# Patient Record
Sex: MALE | Race: WHITE | NOT HISPANIC OR LATINO | Employment: FULL TIME | URBAN - METROPOLITAN AREA
[De-identification: names, ages, dates, MRNs, and addresses within clinical notes are randomized per-mention and may not be internally consistent; named-entity substitution may affect disease eponyms.]

---

## 2023-02-13 PROBLEM — M25.511 PAIN IN RIGHT SHOULDER: Status: ACTIVE | Noted: 2023-02-13

## 2023-02-13 PROBLEM — S46.919A SHOULDER STRAIN: Status: ACTIVE | Noted: 2023-02-13

## 2023-02-13 PROBLEM — J30.9 ALLERGIC RHINITIS: Status: ACTIVE | Noted: 2023-02-13

## 2023-02-13 PROBLEM — L20.9 ATOPIC DERMATITIS: Status: ACTIVE | Noted: 2023-02-13

## 2023-02-13 PROBLEM — R07.81 RIB PAIN ON LEFT SIDE: Status: ACTIVE | Noted: 2023-02-13

## 2023-02-13 PROBLEM — J45.909 ASTHMA (HHS-HCC): Status: ACTIVE | Noted: 2023-02-13

## 2023-02-13 PROBLEM — J02.0 STREP THROAT: Status: ACTIVE | Noted: 2023-02-13

## 2023-02-13 PROBLEM — D22.9: Status: ACTIVE | Noted: 2023-02-13

## 2023-02-13 PROBLEM — R05.3 COUGH, PERSISTENT: Status: ACTIVE | Noted: 2023-02-13

## 2023-02-13 PROBLEM — F41.8 SITUATIONAL ANXIETY: Status: ACTIVE | Noted: 2023-02-13

## 2023-02-13 PROBLEM — J20.9 ACUTE BRONCHITIS WITH BRONCHOSPASM: Status: ACTIVE | Noted: 2023-02-13

## 2023-02-13 PROBLEM — H69.91 DYSFUNCTION OF RIGHT EUSTACHIAN TUBE: Status: ACTIVE | Noted: 2023-02-13

## 2023-02-13 PROBLEM — L85.3 DRY SKIN: Status: ACTIVE | Noted: 2023-02-13

## 2023-02-13 PROBLEM — N64.4 MASTALGIA: Status: ACTIVE | Noted: 2023-02-13

## 2023-02-13 PROBLEM — M75.82 TENDINITIS OF LEFT PECTORALIS MAJOR: Status: ACTIVE | Noted: 2023-02-13

## 2023-02-13 PROBLEM — H60.543 ECZEMATOID OTITIS EXTERNA OF BOTH EARS: Status: ACTIVE | Noted: 2023-02-13

## 2023-02-13 PROBLEM — M25.559 HIP PAIN: Status: ACTIVE | Noted: 2023-02-13

## 2023-02-13 PROBLEM — M75.21 BICEPS TENDINITIS, RIGHT: Status: ACTIVE | Noted: 2023-02-13

## 2023-02-13 PROBLEM — F41.9 ANXIETY DISORDER: Status: ACTIVE | Noted: 2023-02-13

## 2023-02-13 PROBLEM — M24.159 LABRAL TEAR OF HIP, DEGENERATIVE: Status: ACTIVE | Noted: 2023-02-13

## 2023-02-13 PROBLEM — R07.89 COSTOCHONDRAL PAIN: Status: ACTIVE | Noted: 2023-02-13

## 2023-02-13 PROBLEM — J02.9 PHARYNGITIS: Status: ACTIVE | Noted: 2023-02-13

## 2023-02-13 PROBLEM — S46.811A STRAIN OF RIGHT DELTOID MUSCLE: Status: ACTIVE | Noted: 2023-02-13

## 2023-02-13 PROBLEM — B37.0 THRUSH, ORAL: Status: ACTIVE | Noted: 2023-02-13

## 2023-02-13 PROBLEM — R06.2 EXPIRATORY WHEEZING: Status: ACTIVE | Noted: 2023-02-13

## 2023-02-13 PROBLEM — N64.4 BREAST PAIN, LEFT: Status: ACTIVE | Noted: 2023-02-13

## 2023-02-13 PROBLEM — L64.9 MALE PATTERN BALDNESS: Status: ACTIVE | Noted: 2023-02-13

## 2023-02-13 PROBLEM — K64.4 EXTERNAL HEMORRHOIDS: Status: ACTIVE | Noted: 2023-02-13

## 2023-02-13 PROBLEM — J31.0 RHINITIS: Status: ACTIVE | Noted: 2023-02-13

## 2023-02-13 PROBLEM — N62 GYNECOMASTIA, MALE: Status: ACTIVE | Noted: 2023-02-13

## 2023-02-13 RX ORDER — CETIRIZINE HYDROCHLORIDE 10 MG/1
TABLET ORAL
COMMUNITY

## 2023-02-13 RX ORDER — NYSTATIN 100000 [USP'U]/ML
SUSPENSION ORAL
COMMUNITY
Start: 2019-02-12 | End: 2023-07-13 | Stop reason: ALTCHOICE

## 2023-02-13 RX ORDER — ALBUTEROL SULFATE 90 UG/1
2 AEROSOL, METERED RESPIRATORY (INHALATION) EVERY 4 HOURS PRN
COMMUNITY
Start: 2017-12-13 | End: 2023-07-13 | Stop reason: ALTCHOICE

## 2023-02-13 RX ORDER — FLUTICASONE PROPIONATE AND SALMETEROL 250; 50 UG/1; UG/1
POWDER RESPIRATORY (INHALATION)
COMMUNITY
End: 2023-07-13 | Stop reason: ALTCHOICE

## 2023-02-13 RX ORDER — CLOBETASOL PROPIONATE 0.5 MG/G
CREAM TOPICAL
COMMUNITY
Start: 2018-10-23 | End: 2023-07-13 | Stop reason: ALTCHOICE

## 2023-02-13 RX ORDER — ALBUTEROL SULFATE 0.83 MG/ML
SOLUTION RESPIRATORY (INHALATION)
COMMUNITY
Start: 2020-03-17 | End: 2023-07-13 | Stop reason: ALTCHOICE

## 2023-02-13 RX ORDER — MELOXICAM 15 MG/1
1 TABLET ORAL DAILY
COMMUNITY
Start: 2022-09-12 | End: 2023-07-13 | Stop reason: ALTCHOICE

## 2023-02-13 RX ORDER — TAMOXIFEN CITRATE 20 MG/1
1 TABLET ORAL DAILY
COMMUNITY
Start: 2021-11-19 | End: 2023-07-13 | Stop reason: ALTCHOICE

## 2023-02-13 RX ORDER — NEBULIZER AND COMPRESSOR
EACH MISCELLANEOUS
COMMUNITY
Start: 2020-03-16 | End: 2023-07-13 | Stop reason: ALTCHOICE

## 2023-02-13 RX ORDER — PROPRANOLOL HYDROCHLORIDE 10 MG/1
TABLET ORAL
COMMUNITY
Start: 2016-03-09 | End: 2023-07-13 | Stop reason: ALTCHOICE

## 2023-02-13 RX ORDER — AZELASTINE 1 MG/ML
2 SPRAY, METERED NASAL DAILY
COMMUNITY
Start: 2021-05-14 | End: 2023-07-13 | Stop reason: ALTCHOICE

## 2023-02-13 RX ORDER — FINASTERIDE 1 MG/1
TABLET, FILM COATED ORAL
COMMUNITY
Start: 2018-11-05 | End: 2024-03-01

## 2023-02-13 RX ORDER — FLUTICASONE PROPIONATE 50 MCG
SPRAY, SUSPENSION (ML) NASAL
COMMUNITY
End: 2024-05-29 | Stop reason: ALTCHOICE

## 2023-07-13 ENCOUNTER — OFFICE VISIT (OUTPATIENT)
Dept: PRIMARY CARE | Facility: CLINIC | Age: 43
End: 2023-07-13
Payer: COMMERCIAL

## 2023-07-13 VITALS — DIASTOLIC BLOOD PRESSURE: 84 MMHG | RESPIRATION RATE: 20 BRPM | SYSTOLIC BLOOD PRESSURE: 122 MMHG | HEART RATE: 74 BPM

## 2023-07-13 DIAGNOSIS — S03.00XA DISLOCATION OF TEMPOROMANDIBULAR JOINT, INITIAL ENCOUNTER: Primary | ICD-10-CM

## 2023-07-13 DIAGNOSIS — Z87.898 HISTORY OF GYNECOMASTIA: ICD-10-CM

## 2023-07-13 DIAGNOSIS — F45.8 BRUXISM (TEETH GRINDING): ICD-10-CM

## 2023-07-13 DIAGNOSIS — Z00.00 ROUTINE GENERAL MEDICAL EXAMINATION AT HEALTH CARE FACILITY: ICD-10-CM

## 2023-07-13 DIAGNOSIS — H93.11 TINNITUS OF RIGHT EAR: ICD-10-CM

## 2023-07-13 PROCEDURE — 99213 OFFICE O/P EST LOW 20 MIN: CPT | Performed by: INTERNAL MEDICINE

## 2023-07-13 RX ORDER — MONTELUKAST SODIUM 10 MG/1
10 TABLET ORAL NIGHTLY
COMMUNITY
End: 2023-08-02 | Stop reason: SDUPTHER

## 2023-07-13 ASSESSMENT — ENCOUNTER SYMPTOMS
SINUS PRESSURE: 0
FACIAL SWELLING: 0
VOICE CHANGE: 0
RHINORRHEA: 1
TROUBLE SWALLOWING: 0
SORE THROAT: 0
SINUS PAIN: 0

## 2023-07-13 NOTE — ASSESSMENT & PLAN NOTE
Will get him to ENT due to chronic symptoms but more likely from bruxism/tmj issues  Can try lipoflavonoid to see if helpful  Would lessen caffeine/salt consumption to see if this helps

## 2023-07-13 NOTE — PROGRESS NOTES
Subjective   Patient ID: Madhav Rodriguez is a 42 y.o. male who presents for Tinnitus.    HPI  Pt here in acute visit.  He has a high pitched ringing in his ear.  He thinks it is from the right ear only.  He has some pressure sensation in the right ear as well.  He has tried to pop his ear by holding his nose and blowing out.  He has constant drainage as well.  He is doing Flonase every day and zrytec/singulair.      He feels caffeine makes the ringing high.  He knows he grinds his teeth.  He does wear a  but this was OTC.  He has pain in jaw due to grinding.  His jaw hurts and clicks when mouth is open wide.      Review of Systems   HENT:  Positive for dental problem, postnasal drip, rhinorrhea and tinnitus. Negative for congestion, drooling, ear discharge, ear pain, facial swelling, hearing loss, mouth sores, nosebleeds, sinus pressure, sinus pain, sneezing, sore throat, trouble swallowing and voice change.        Objective   /84   Pulse 74   Resp 20    Physical Exam  HENT:      Right Ear: Tympanic membrane, ear canal and external ear normal.      Left Ear: Tympanic membrane, ear canal and external ear normal.      Nose: Congestion present. No rhinorrhea.      Mouth/Throat:      Mouth: Mucous membranes are dry.      Pharynx: Oropharynx is clear. No oropharyngeal exudate or posterior oropharyngeal erythema.   Neck:      Vascular: No carotid bruit.   Musculoskeletal:      Cervical back: No rigidity or tenderness.   Lymphadenopathy:      Cervical: No cervical adenopathy.   Neurological:      Mental Status: He is oriented to person, place, and time.         Assessment/Plan   Problem List Items Addressed This Visit       Dislocation of jaw - Primary     Pt has clicking/discomfort of the jaw on right that is likely cause of the tinnitus he is having  Would like him to see PT dedicated to this area  Would also like him to talk to dentist regarding grinding teeth and making of dental appliance to prevent  this           Relevant Orders    Referral to Physical Therapy    Tinnitus of right ear     Will get him to ENT due to chronic symptoms but more likely from bruxism/tmj issues  Can try lipoflavonoid to see if helpful  Would lessen caffeine/salt consumption to see if this helps          Relevant Orders    Referral to ENT     Other Visit Diagnoses       Bruxism (teeth grinding)        Relevant Orders    Referral to Physical Therapy    Routine general medical examination at health care facility        Relevant Orders    CBC    Comprehensive Metabolic Panel    Lipid Panel    Thyroid Stimulating Hormone    Urinalysis with Reflex Microscopic    Follow Up In Primary Care - Health Maintenance    History of gynecomastia        Relevant Orders    Prolactin level    Estrogens, Total    Testosterone, total and free

## 2023-07-13 NOTE — ASSESSMENT & PLAN NOTE
Pt has clicking/discomfort of the jaw on right that is likely cause of the tinnitus he is having  Would like him to see PT dedicated to this area  Would also like him to talk to dentist regarding grinding teeth and making of dental appliance to prevent this

## 2023-07-13 NOTE — PATIENT INSTRUCTIONS
Call and get into PT to work on the jaw and TMJ/grinding teeth issue   Call and get into ENT for chronic sinus issues  Lessen caffeine and salt in diet as these can make ringing ear worse  Lipoflavonoid is a vitamin that has been shown to help some with ringing in ear   Get your labs and urine done in next few weeks  Schedule physical in next 1-2 months

## 2023-07-31 ENCOUNTER — LAB (OUTPATIENT)
Dept: LAB | Facility: LAB | Age: 43
End: 2023-07-31
Payer: COMMERCIAL

## 2023-07-31 DIAGNOSIS — Z00.00 ROUTINE GENERAL MEDICAL EXAMINATION AT HEALTH CARE FACILITY: ICD-10-CM

## 2023-07-31 DIAGNOSIS — Z87.898 HISTORY OF GYNECOMASTIA: ICD-10-CM

## 2023-07-31 LAB
ALANINE AMINOTRANSFERASE (SGPT) (U/L) IN SER/PLAS: 21 U/L (ref 10–52)
ALBUMIN (G/DL) IN SER/PLAS: 5 G/DL (ref 3.4–5)
ALKALINE PHOSPHATASE (U/L) IN SER/PLAS: 68 U/L (ref 33–120)
ANION GAP IN SER/PLAS: 11 MMOL/L (ref 10–20)
APPEARANCE, URINE: CLEAR
ASPARTATE AMINOTRANSFERASE (SGOT) (U/L) IN SER/PLAS: 23 U/L (ref 9–39)
BILIRUBIN TOTAL (MG/DL) IN SER/PLAS: 0.8 MG/DL (ref 0–1.2)
BILIRUBIN, URINE: NEGATIVE
BLOOD, URINE: NEGATIVE
CALCIUM (MG/DL) IN SER/PLAS: 9.5 MG/DL (ref 8.6–10.6)
CARBON DIOXIDE, TOTAL (MMOL/L) IN SER/PLAS: 28 MMOL/L (ref 21–32)
CHLORIDE (MMOL/L) IN SER/PLAS: 104 MMOL/L (ref 98–107)
CHOLESTEROL (MG/DL) IN SER/PLAS: 192 MG/DL (ref 0–199)
CHOLESTEROL IN HDL (MG/DL) IN SER/PLAS: 56.4 MG/DL
CHOLESTEROL/HDL RATIO: 3.4
COLOR, URINE: YELLOW
CREATININE (MG/DL) IN SER/PLAS: 1.1 MG/DL (ref 0.5–1.3)
ERYTHROCYTE DISTRIBUTION WIDTH (RATIO) BY AUTOMATED COUNT: 11.6 % (ref 11.5–14.5)
ERYTHROCYTE MEAN CORPUSCULAR HEMOGLOBIN CONCENTRATION (G/DL) BY AUTOMATED: 34.6 G/DL (ref 32–36)
ERYTHROCYTE MEAN CORPUSCULAR VOLUME (FL) BY AUTOMATED COUNT: 95 FL (ref 80–100)
ERYTHROCYTES (10*6/UL) IN BLOOD BY AUTOMATED COUNT: 5.08 X10E12/L (ref 4.5–5.9)
GFR MALE: 84 ML/MIN/1.73M2
GLUCOSE (MG/DL) IN SER/PLAS: 98 MG/DL (ref 74–99)
GLUCOSE, URINE: NEGATIVE MG/DL
HEMATOCRIT (%) IN BLOOD BY AUTOMATED COUNT: 48.2 % (ref 41–52)
HEMOGLOBIN (G/DL) IN BLOOD: 16.7 G/DL (ref 13.5–17.5)
KETONES, URINE: NEGATIVE MG/DL
LDL: 120 MG/DL (ref 0–99)
LEUKOCYTE ESTERASE, URINE: NEGATIVE
LEUKOCYTES (10*3/UL) IN BLOOD BY AUTOMATED COUNT: 3.3 X10E9/L (ref 4.4–11.3)
NITRITE, URINE: NEGATIVE
NRBC (PER 100 WBCS) BY AUTOMATED COUNT: 0 /100 WBC (ref 0–0)
PH, URINE: 5 (ref 5–8)
PLATELETS (10*3/UL) IN BLOOD AUTOMATED COUNT: 154 X10E9/L (ref 150–450)
POTASSIUM (MMOL/L) IN SER/PLAS: 4.3 MMOL/L (ref 3.5–5.3)
PROLACTIN (UG/L) IN SER/PLAS: 5.8 UG/L (ref 2–18)
PROTEIN TOTAL: 7.2 G/DL (ref 6.4–8.2)
PROTEIN, URINE: NEGATIVE MG/DL
SODIUM (MMOL/L) IN SER/PLAS: 139 MMOL/L (ref 136–145)
SPECIFIC GRAVITY, URINE: 1.03 (ref 1–1.03)
THYROTROPIN (MIU/L) IN SER/PLAS BY DETECTION LIMIT <= 0.05 MIU/L: 1.56 MIU/L (ref 0.44–3.98)
TRIGLYCERIDE (MG/DL) IN SER/PLAS: 78 MG/DL (ref 0–149)
UREA NITROGEN (MG/DL) IN SER/PLAS: 31 MG/DL (ref 6–23)
UROBILINOGEN, URINE: <2 MG/DL (ref 0–1.9)
VLDL: 16 MG/DL (ref 0–40)

## 2023-07-31 PROCEDURE — 82672 ASSAY OF ESTROGEN: CPT

## 2023-07-31 PROCEDURE — 84146 ASSAY OF PROLACTIN: CPT

## 2023-07-31 PROCEDURE — 84402 ASSAY OF FREE TESTOSTERONE: CPT

## 2023-07-31 PROCEDURE — 80053 COMPREHEN METABOLIC PANEL: CPT

## 2023-07-31 PROCEDURE — 85027 COMPLETE CBC AUTOMATED: CPT

## 2023-07-31 PROCEDURE — 36415 COLL VENOUS BLD VENIPUNCTURE: CPT

## 2023-07-31 PROCEDURE — 84443 ASSAY THYROID STIM HORMONE: CPT

## 2023-07-31 PROCEDURE — 80061 LIPID PANEL: CPT

## 2023-07-31 PROCEDURE — 81003 URINALYSIS AUTO W/O SCOPE: CPT

## 2023-07-31 PROCEDURE — 84403 ASSAY OF TOTAL TESTOSTERONE: CPT

## 2023-08-01 DIAGNOSIS — J30.9 ALLERGIC RHINITIS, UNSPECIFIED: ICD-10-CM

## 2023-08-02 RX ORDER — MONTELUKAST SODIUM 10 MG/1
10 TABLET ORAL NIGHTLY
Qty: 30 TABLET | Refills: 5 | Status: SHIPPED | OUTPATIENT
Start: 2023-08-02 | End: 2023-11-14 | Stop reason: SDUPTHER

## 2023-08-05 LAB
TESTOSTERONE FREE (CHAN): 86.3 PG/ML (ref 35–155)
TESTOSTERONE,TOTAL,LC-MS/MS: 612 NG/DL (ref 250–1100)

## 2023-08-06 LAB — ESTROGEN,TOTAL: 56 PG/ML (ref 56–213)

## 2023-09-11 PROBLEM — R03.0 BLOOD PRESSURE ELEVATED WITHOUT HISTORY OF HTN: Status: ACTIVE | Noted: 2023-09-11

## 2023-09-11 PROBLEM — R10.13 DYSPEPSIA: Status: ACTIVE | Noted: 2017-12-19

## 2023-09-11 PROBLEM — R53.83 FATIGUE: Status: ACTIVE | Noted: 2023-09-11

## 2023-09-11 PROBLEM — K21.9 GASTROESOPHAGEAL REFLUX DISEASE: Status: ACTIVE | Noted: 2023-09-11

## 2023-09-11 RX ORDER — AZELASTINE HYDROCHLORIDE, FLUTICASONE PROPIONATE 137; 50 UG/1; UG/1
1 SPRAY, METERED NASAL 2 TIMES DAILY
COMMUNITY
Start: 2014-05-28 | End: 2024-05-29 | Stop reason: WASHOUT

## 2023-09-11 RX ORDER — ALBUTEROL SULFATE 90 UG/1
AEROSOL, METERED RESPIRATORY (INHALATION)
COMMUNITY
Start: 2019-02-12 | End: 2024-05-29 | Stop reason: WASHOUT

## 2023-09-11 RX ORDER — OMEPRAZOLE 10 MG/1
CAPSULE, DELAYED RELEASE ORAL
COMMUNITY
Start: 2017-10-12 | End: 2023-11-09

## 2023-09-11 RX ORDER — ACETAMINOPHEN 325 MG/1
650 TABLET ORAL EVERY 4 HOURS PRN
COMMUNITY
Start: 2014-12-31 | End: 2023-11-09 | Stop reason: WASHOUT

## 2023-09-11 RX ORDER — TAMOXIFEN CITRATE 20 MG/1
20 TABLET ORAL
COMMUNITY
Start: 2019-03-04 | End: 2023-11-14 | Stop reason: SDUPTHER

## 2023-09-11 RX ORDER — ETODOLAC 400 MG/1
1 TABLET, FILM COATED ORAL
COMMUNITY
Start: 2015-11-25 | End: 2023-11-09 | Stop reason: WASHOUT

## 2023-09-22 ENCOUNTER — APPOINTMENT (OUTPATIENT)
Dept: PRIMARY CARE | Facility: CLINIC | Age: 43
End: 2023-09-22
Payer: COMMERCIAL

## 2023-11-03 ENCOUNTER — TELEPHONE (OUTPATIENT)
Dept: PRIMARY CARE | Facility: CLINIC | Age: 43
End: 2023-11-03
Payer: COMMERCIAL

## 2023-11-03 DIAGNOSIS — U07.1 COVID: Primary | ICD-10-CM

## 2023-11-03 NOTE — TELEPHONE ENCOUNTER
I sent in paxlovid; as for physical next week depends on how he is feeling if past Day 5 (so needs to quarantine) for first 5 days of symptoms, day 6-10 can go out if feeling better with mask on  If still feeling bad can cancel

## 2023-11-03 NOTE — TELEPHONE ENCOUNTER
Madhav tested pos for COVID today his symptoms are a lot of drainage pressure sinus/chest fatigue. Asking for Paxlovid he has asthma. Also has H&P next week Thursday, should he keep that??

## 2023-11-09 ENCOUNTER — OFFICE VISIT (OUTPATIENT)
Dept: PRIMARY CARE | Facility: CLINIC | Age: 43
End: 2023-11-09
Payer: COMMERCIAL

## 2023-11-09 VITALS
BODY MASS INDEX: 26 KG/M2 | SYSTOLIC BLOOD PRESSURE: 126 MMHG | HEIGHT: 71 IN | WEIGHT: 185.7 LBS | HEART RATE: 72 BPM | RESPIRATION RATE: 16 BRPM | DIASTOLIC BLOOD PRESSURE: 80 MMHG

## 2023-11-09 DIAGNOSIS — N62 GYNECOMASTIA, MALE: ICD-10-CM

## 2023-11-09 DIAGNOSIS — J30.2 SEASONAL ALLERGIC RHINITIS, UNSPECIFIED TRIGGER: Primary | ICD-10-CM

## 2023-11-09 DIAGNOSIS — M24.159 LABRAL TEAR OF HIP, DEGENERATIVE: ICD-10-CM

## 2023-11-09 DIAGNOSIS — L64.9 MALE PATTERN BALDNESS: ICD-10-CM

## 2023-11-09 DIAGNOSIS — J45.20 MILD INTERMITTENT ASTHMA WITHOUT COMPLICATION (HHS-HCC): ICD-10-CM

## 2023-11-09 PROBLEM — J31.0 RHINITIS: Status: RESOLVED | Noted: 2023-02-13 | Resolved: 2023-11-09

## 2023-11-09 PROBLEM — J02.9 PHARYNGITIS: Status: RESOLVED | Noted: 2023-02-13 | Resolved: 2023-11-09

## 2023-11-09 PROBLEM — R03.0 BLOOD PRESSURE ELEVATED WITHOUT HISTORY OF HTN: Status: RESOLVED | Noted: 2023-09-11 | Resolved: 2023-11-09

## 2023-11-09 PROBLEM — B37.0 THRUSH, ORAL: Status: RESOLVED | Noted: 2023-02-13 | Resolved: 2023-11-09

## 2023-11-09 PROBLEM — R05.3 COUGH, PERSISTENT: Status: RESOLVED | Noted: 2023-02-13 | Resolved: 2023-11-09

## 2023-11-09 PROBLEM — R07.81 RIB PAIN ON LEFT SIDE: Status: RESOLVED | Noted: 2023-02-13 | Resolved: 2023-11-09

## 2023-11-09 PROBLEM — J02.0 STREP THROAT: Status: RESOLVED | Noted: 2023-02-13 | Resolved: 2023-11-09

## 2023-11-09 PROBLEM — J20.9 ACUTE BRONCHITIS WITH BRONCHOSPASM: Status: RESOLVED | Noted: 2023-02-13 | Resolved: 2023-11-09

## 2023-11-09 PROCEDURE — 99214 OFFICE O/P EST MOD 30 MIN: CPT | Performed by: INTERNAL MEDICINE

## 2023-11-09 RX ORDER — OMEPRAZOLE 20 MG/1
20 TABLET, DELAYED RELEASE ORAL
COMMUNITY

## 2023-11-09 ASSESSMENT — ENCOUNTER SYMPTOMS
FREQUENCY: 0
FATIGUE: 0
NAUSEA: 0
ABDOMINAL PAIN: 0
DIZZINESS: 0
DIARRHEA: 0
CONSTIPATION: 0
CHILLS: 0
DYSURIA: 0
FLANK PAIN: 0
SLEEP DISTURBANCE: 0
CHEST TIGHTNESS: 0
DYSPHORIC MOOD: 0
COUGH: 0
ACTIVITY CHANGE: 0
NERVOUS/ANXIOUS: 0
SHORTNESS OF BREATH: 0
UNEXPECTED WEIGHT CHANGE: 0
PALPITATIONS: 0
LIGHT-HEADEDNESS: 0
DIFFICULTY URINATING: 0
FEVER: 0
APPETITE CHANGE: 0
ARTHRALGIAS: 1
HEADACHES: 0
VOMITING: 0
WHEEZING: 0

## 2023-11-09 NOTE — PROGRESS NOTES
"Subjective   Patient ID: Madhav Rodriguez is a 43 y.o. male who presents for Annual Exam.    HPI  Pt here in follow up.     His breathing and allergies overall have improved since starting Singulair.  He is not using his inhalers as frequent which has stopped thrush and improved oral health.      He recently had Covid last week with runny nose/sinus pressure minor sore throat.  No fever/chills/no cough.  This is second time he has had it.  He did Paxlovid which helped.      He had tetanus shot due to stepping on a nail-left foot.  He has had his flu shot.      He has pain to left hip.  He has history of torn labrum in the past.  He tells me he will not have pain when working out but will have it a day later.  The pain is pinching like in nature.      Review of Systems   Constitutional:  Negative for activity change, appetite change, chills, fatigue, fever and unexpected weight change.   Respiratory:  Negative for cough, chest tightness, shortness of breath and wheezing.    Cardiovascular:  Negative for chest pain, palpitations and leg swelling.   Gastrointestinal:  Negative for abdominal pain, constipation, diarrhea, nausea and vomiting.   Genitourinary:  Negative for difficulty urinating, dysuria, flank pain and frequency.   Musculoskeletal:  Positive for arthralgias.   Neurological:  Negative for dizziness, light-headedness and headaches.   Psychiatric/Behavioral:  Negative for dysphoric mood and sleep disturbance. The patient is not nervous/anxious.        Objective   /80 (BP Location: Right arm, Patient Position: Sitting)   Pulse 72   Resp 16   Ht 1.81 m (5' 11.25\")   Wt 84.2 kg (185 lb 11.2 oz)   BMI 25.72 kg/m²    Physical Exam  Constitutional:       Appearance: Normal appearance.   Cardiovascular:      Rate and Rhythm: Normal rate and regular rhythm.      Pulses: Normal pulses.      Heart sounds: Normal heart sounds.   Pulmonary:      Effort: Pulmonary effort is normal.      Breath sounds: Normal " breath sounds.   Abdominal:      General: Bowel sounds are normal.   Musculoskeletal:      Right lower leg: No edema.      Left lower leg: No edema.   Lymphadenopathy:      Cervical: No cervical adenopathy.   Neurological:      Mental Status: He is alert and oriented to person, place, and time.   Psychiatric:         Mood and Affect: Mood normal.         Assessment/Plan   Problem List Items Addressed This Visit       Asthma     Has improved with use of Singulair  Not having to use inhalers as often          Gynecomastia, male     On Tamoxifen for this  He is considering stopping use          Labral tear of hip, degenerative     Having worsening pain again in left hip   He will talk with ortho likely after 1st of year          Male pattern baldness     Uses finasteride with some improvements          Allergic rhinitis - Primary     Recommend changing antihistamine to Xyzal   Continue nasal sprays  Considering seen ENT due to constant runny nose

## 2023-11-09 NOTE — PATIENT INSTRUCTIONS
Change antihistamine Xyzal is another option  Continue nasal sprays as well   All blood work in late July was ok  Continue healthy diet/exercise as you are doing  Rest, ibuprofen as needed, ice to hip to manage discomfort; see ortho when ready  Follow up here in July for full physical (come fasting)

## 2023-11-09 NOTE — ASSESSMENT & PLAN NOTE
Recommend changing antihistamine to Xyzal   Continue nasal sprays  Considering seen ENT due to constant runny nose

## 2023-11-14 DIAGNOSIS — N62 GYNECOMASTIA, MALE: Primary | ICD-10-CM

## 2023-11-14 DIAGNOSIS — J30.9 ALLERGIC RHINITIS, UNSPECIFIED: ICD-10-CM

## 2023-11-14 RX ORDER — MONTELUKAST SODIUM 10 MG/1
10 TABLET ORAL NIGHTLY
Qty: 90 TABLET | Refills: 3 | Status: SHIPPED | OUTPATIENT
Start: 2023-11-14

## 2023-11-14 RX ORDER — TAMOXIFEN CITRATE 20 MG/1
20 TABLET ORAL
Qty: 90 TABLET | Refills: 3 | Status: SHIPPED | OUTPATIENT
Start: 2023-11-14

## 2023-11-14 NOTE — PROGRESS NOTES
Patient ID: Madhav Rodriguez is a 43 y.o. male who presents for No chief complaint on file..  HPI  ***    ROS  Comprehensive review of systems is negative.    Objective   Physical Exam  There were no vitals taken for this visit.   There were no vitals filed for this visit.   There is no height or weight on file to calculate BMI.      ***    Assessment/Plan     ***

## 2024-01-05 DIAGNOSIS — R09.81 NASAL CONGESTION: Primary | ICD-10-CM

## 2024-01-31 ENCOUNTER — APPOINTMENT (OUTPATIENT)
Dept: OTOLARYNGOLOGY | Facility: CLINIC | Age: 44
End: 2024-01-31
Payer: COMMERCIAL

## 2024-02-14 ENCOUNTER — OFFICE VISIT (OUTPATIENT)
Dept: OTOLARYNGOLOGY | Facility: CLINIC | Age: 44
End: 2024-02-14
Payer: COMMERCIAL

## 2024-02-14 VITALS — TEMPERATURE: 97.9 F | HEIGHT: 72 IN | WEIGHT: 190.1 LBS | BODY MASS INDEX: 25.75 KG/M2

## 2024-02-14 DIAGNOSIS — J30.0 VASOMOTOR RHINITIS: ICD-10-CM

## 2024-02-14 DIAGNOSIS — R04.0 EPISTAXIS: Primary | ICD-10-CM

## 2024-02-14 DIAGNOSIS — R09.81 NASAL CONGESTION: ICD-10-CM

## 2024-02-14 PROCEDURE — 99213 OFFICE O/P EST LOW 20 MIN: CPT | Performed by: OTOLARYNGOLOGY

## 2024-02-14 PROCEDURE — 1036F TOBACCO NON-USER: CPT | Performed by: OTOLARYNGOLOGY

## 2024-02-14 PROCEDURE — 31231 NASAL ENDOSCOPY DX: CPT | Performed by: OTOLARYNGOLOGY

## 2024-02-14 RX ORDER — MOMETASONE FUROATE 100 %
POWDER (GRAM) MISCELLANEOUS
Qty: 180 G | Refills: 1 | Status: SHIPPED | OUTPATIENT
Start: 2024-02-14 | End: 2024-04-09 | Stop reason: SDUPTHER

## 2024-02-14 RX ORDER — MUPIROCIN 20 MG/G
1 OINTMENT TOPICAL 2 TIMES DAILY
Qty: 22 G | Refills: 0 | Status: SHIPPED | OUTPATIENT
Start: 2024-02-14 | End: 2024-02-28

## 2024-02-14 RX ORDER — IPRATROPIUM BROMIDE 42 UG/1
2 SPRAY, METERED NASAL 3 TIMES DAILY
Qty: 15 ML | Refills: 1 | Status: SHIPPED | OUTPATIENT
Start: 2024-02-14 | End: 2024-04-09 | Stop reason: SDUPTHER

## 2024-02-14 ASSESSMENT — PATIENT HEALTH QUESTIONNAIRE - PHQ9
2. FEELING DOWN, DEPRESSED OR HOPELESS: NOT AT ALL
1. LITTLE INTEREST OR PLEASURE IN DOING THINGS: NOT AT ALL
SUM OF ALL RESPONSES TO PHQ9 QUESTIONS 1 AND 2: 0

## 2024-02-14 NOTE — PROGRESS NOTES
Chief Complaint:  PND    Referring Provider: Jocelyn Morales DO    History of Present Illness:    Madhav Rodriguez is a 43 year old male seen for complaints of constant PND.     He is a well appearing male with complaints of chronic PND. He does have a history of allergies and states he was on allergy shots around ten or more years ago. This did help but did not resolve his PND however, he stopped these due to side effects. Reports being allergic to dust mites, pollen, and multiple other outdoor seasonal allergies.  Today he complains of intermittent sinus pressure (L>R), nasal obstruction (L>R), and anterior and posterior nasal drainage (clear and persistent). Other symptoms include tinnitus and pressure behind his left eye after blowing his nose. He denies hoarseness, throat clearing, epistaxis, frequent sinus infections requiring antibiotics, and recent antibiotic use. He has been told by his general practitioner in the past that he has gastric reflux and does currently take Omeprazole for this. He does not control his diet and does admit his symptoms feel worse after eating. Denies aspirin sensitivity. Does have reported viral induced asthma. Currently takes OTC Flonase and Cetrizine which he feels he has built a tolerance to at this time. He has tried Azelastine in the past and multiple OTC oral antihistamines without relief or improvement in symptoms. Reports history of septoplasty as an early teenager and polypectomy prior to this. Interestingly he does feel like his drainage worsens with eating/drinking.    ?  Review of Systems:    Review of symptoms was negative except for those stated including Cardiopulmonary, Genitourinary, Gastrointestinal, Psychological, Sleep pattern, Endocrine, Eyes, Neurologic, Musculoskeletal, Skin, Hematologic/Lymphatic and Allergic/Immunologic.     Medical History:    I have reviewed the patient's updated past medical history, surgical history, family history, social history, as well  as current medications and allergies as of 2/14/2024. Changes to these items have been updated and marked as reviewed in the electronic medical record.    Physical Exam:    Vitals:  height is 1.829 m (6') and weight is 86.2 kg (190 lb 1.6 oz). His temperature is 36.6 °C (97.9 °F).   General: Patient doing well overall and is in no apparent distress.  Psych: Pleasant affect, and answers questions appropriately.  Head & Face: Symmetric facial movements  Eyes: Pupils equal, round, reactive.  Extraocular movements intact without gaze restrictions or nystagmus. No epiphora.  Ears:  External auditory canals are normal.  Tympanic membranes are clear.  No middle ear effusion is seen.  All middle ear landmarks are normal.  Nose: Anterior rhinoscopy revealed normal sinonasal mucosa. More posterior areas of the nasal cavity could not be completely examined.  Oral Cavity/Oropharynx:  Without lesions or masses to visual exam.  Neck: Supple without lymphadenopathy.  Lungs: Non-labored, and without evidence of stridor.  Cardiac: Pulses are strong, well-perfused.  Extremities: Without gross evidence of clubbing, cyanosis, or edema.  Neuro: Cranial nerves II-XII grossly intact; Intact facial movements.    Procedure:  Rigid nasal endoscopy (35719)  Pre-procedure diagnosis/Indication for procedure:  To evaluate areas not visualized on anterior rhinoscopy   Anesthesia:  1% phenylephrine and 4% lidocaine topical spray   Description:  A 0-degree 4-mm rigid nasal endoscope was used to examine the left and right nasal cavities.  The nasal valve areas were examined for abnormalities or collapse.  The inferior and middle turbinates were evaluated.  The middle and superior meatuses, and the sphenoethmoid recesses were examined and inspected for mucopurulence and polyps. Once the endoscope was withdrawn, the patient was noted to have tolerated the procedure well without complications and was returned to ambulatory  status.    Findings:    Partially excised inferior turbinates noted bilaterally. Nasal mucosa is dry and hyperemic. No evidence of ethmoidectomy or maxillary antrostomy noted. Some thin mucous noted bilaterally. Nasopharynx and middle meatus clear. Sphenoethmoidal recess is clear bilaterally. There is evidence of vestibulitis noted anteriorly bilaterally.       Assessment:     Madhav Rodriguez is a 43 y.o. male with chronic sinusitis, prior endoscopic sinus surgery (unclear to what extent), nasal obstruction/post-nasal drainage, possible vasomotor rhinitis.    Plan:      Mometasone irrigations BID  Ipratropium bromide to rule out vasomotor rhinitis  Mupirocin BID for 14 days  CT Sinus after 6-8 weeks to assess response to treatment  Follow up in 8 weeks      Yelitza Sheffield MD, M.Eng.   of Otolaryngology - Head & Neck Surgery  Division of Rhinology and Endoscopic Skull Base Surgery  [facility]

## 2024-03-01 DIAGNOSIS — L64.9 ANDROGENIC ALOPECIA, UNSPECIFIED: ICD-10-CM

## 2024-03-01 RX ORDER — FINASTERIDE 1 MG/1
1 TABLET, FILM COATED ORAL DAILY
Qty: 90 TABLET | Refills: 3 | Status: SHIPPED | OUTPATIENT
Start: 2024-03-01 | End: 2024-05-28 | Stop reason: SDUPTHER

## 2024-04-09 DIAGNOSIS — R09.81 NASAL CONGESTION: ICD-10-CM

## 2024-04-09 RX ORDER — MOMETASONE FUROATE 100 %
POWDER (GRAM) MISCELLANEOUS
Qty: 180 G | Refills: 1 | Status: SHIPPED | OUTPATIENT
Start: 2024-04-09

## 2024-04-09 RX ORDER — IPRATROPIUM BROMIDE 42 UG/1
2 SPRAY, METERED NASAL 3 TIMES DAILY
Qty: 30 ML | Refills: 3 | Status: SHIPPED | OUTPATIENT
Start: 2024-04-09 | End: 2024-05-09

## 2024-05-14 DIAGNOSIS — R69 TRAVEL-RELATED ILLNESS: ICD-10-CM

## 2024-05-14 DIAGNOSIS — Z29.89 NEED FOR MALARIA PROPHYLAXIS: Primary | ICD-10-CM

## 2024-05-14 RX ORDER — AZITHROMYCIN 250 MG/1
TABLET, FILM COATED ORAL
Qty: 1 TABLET | Refills: 0 | Status: SHIPPED | OUTPATIENT
Start: 2024-05-14

## 2024-05-14 RX ORDER — ATOVAQUONE AND PROGUANIL HYDROCHLORIDE 250; 100 MG/1; MG/1
TABLET, FILM COATED ORAL
Qty: 21 TABLET | Refills: 0 | Status: SHIPPED | OUTPATIENT
Start: 2024-05-14

## 2024-05-28 DIAGNOSIS — L64.9 ANDROGENIC ALOPECIA, UNSPECIFIED: ICD-10-CM

## 2024-05-28 RX ORDER — FINASTERIDE 1 MG/1
1 TABLET, FILM COATED ORAL DAILY
Qty: 90 TABLET | Refills: 3 | Status: SHIPPED | OUTPATIENT
Start: 2024-05-28

## 2024-05-29 ENCOUNTER — OFFICE VISIT (OUTPATIENT)
Dept: PRIMARY CARE | Facility: CLINIC | Age: 44
End: 2024-05-29
Payer: COMMERCIAL

## 2024-05-29 VITALS
RESPIRATION RATE: 16 BRPM | SYSTOLIC BLOOD PRESSURE: 116 MMHG | HEIGHT: 71 IN | WEIGHT: 180.2 LBS | DIASTOLIC BLOOD PRESSURE: 80 MMHG | BODY MASS INDEX: 25.23 KG/M2 | HEART RATE: 60 BPM

## 2024-05-29 DIAGNOSIS — Z00.00 ROUTINE GENERAL MEDICAL EXAMINATION AT A HEALTH CARE FACILITY: Primary | ICD-10-CM

## 2024-05-29 DIAGNOSIS — L64.9 MALE PATTERN BALDNESS: ICD-10-CM

## 2024-05-29 DIAGNOSIS — J45.20 MILD INTERMITTENT ASTHMA WITHOUT COMPLICATION (HHS-HCC): ICD-10-CM

## 2024-05-29 DIAGNOSIS — E78.00 ELEVATED LDL CHOLESTEROL LEVEL: ICD-10-CM

## 2024-05-29 DIAGNOSIS — K21.9 GASTROESOPHAGEAL REFLUX DISEASE WITHOUT ESOPHAGITIS: ICD-10-CM

## 2024-05-29 DIAGNOSIS — J30.1 SEASONAL ALLERGIC RHINITIS DUE TO POLLEN: ICD-10-CM

## 2024-05-29 DIAGNOSIS — N62 GYNECOMASTIA, MALE: ICD-10-CM

## 2024-05-29 DIAGNOSIS — H93.11 TINNITUS OF RIGHT EAR: ICD-10-CM

## 2024-05-29 DIAGNOSIS — Z13.29 SCREENING FOR THYROID DISORDER: ICD-10-CM

## 2024-05-29 PROBLEM — R06.2 EXPIRATORY WHEEZING: Status: RESOLVED | Noted: 2023-02-13 | Resolved: 2024-05-29

## 2024-05-29 PROCEDURE — 1036F TOBACCO NON-USER: CPT | Performed by: INTERNAL MEDICINE

## 2024-05-29 PROCEDURE — 99396 PREV VISIT EST AGE 40-64: CPT | Performed by: INTERNAL MEDICINE

## 2024-05-29 RX ORDER — MELOXICAM 15 MG/1
15 TABLET ORAL DAILY
COMMUNITY
Start: 2024-05-20 | End: 2024-08-18

## 2024-05-29 ASSESSMENT — ENCOUNTER SYMPTOMS
ADENOPATHY: 0
BRUISES/BLEEDS EASILY: 0
BACK PAIN: 0
DIARRHEA: 0
DYSPHORIC MOOD: 0
JOINT SWELLING: 0
CHOKING: 0
SLEEP DISTURBANCE: 0
FLANK PAIN: 0
WHEEZING: 0
RHINORRHEA: 0
ABDOMINAL DISTENTION: 0
PHOTOPHOBIA: 0
LIGHT-HEADEDNESS: 0
NECK PAIN: 0
DIZZINESS: 0
SPEECH DIFFICULTY: 0
VOMITING: 0
HEMATURIA: 0
HYPERACTIVE: 0
SHORTNESS OF BREATH: 0
UNEXPECTED WEIGHT CHANGE: 0
MYALGIAS: 0
TREMORS: 0
CONFUSION: 0
DIFFICULTY URINATING: 0
FATIGUE: 0
NUMBNESS: 0
COUGH: 0
PALPITATIONS: 0
FACIAL ASYMMETRY: 0
SINUS PRESSURE: 0
EYE ITCHING: 0
APNEA: 0
WEAKNESS: 0
POLYDIPSIA: 0
WOUND: 0
HEADACHES: 0
NECK STIFFNESS: 0
AGITATION: 0
DYSURIA: 0
RECTAL PAIN: 0
EYE DISCHARGE: 0
CHEST TIGHTNESS: 0
APPETITE CHANGE: 0
VOICE CHANGE: 0
ABDOMINAL PAIN: 0
ARTHRALGIAS: 1
CONSTIPATION: 0
DECREASED CONCENTRATION: 0
SINUS PAIN: 0
POLYPHAGIA: 0
EYE REDNESS: 0
FACIAL SWELLING: 0
FEVER: 0
STRIDOR: 0
HALLUCINATIONS: 0
NAUSEA: 0
SEIZURES: 0
CHILLS: 0
TROUBLE SWALLOWING: 0
ACTIVITY CHANGE: 0
NERVOUS/ANXIOUS: 0
COLOR CHANGE: 0
EYE PAIN: 0
SORE THROAT: 0
BLOOD IN STOOL: 0
ANAL BLEEDING: 0
DIAPHORESIS: 0
FREQUENCY: 0

## 2024-05-29 NOTE — PROGRESS NOTES
Subjective   Patient ID: Madhav Rodriguez is a 43 y.o. male who presents for Annual Exam.    HPI  Pt here for full physical.      He is doing some weight training of late after hurting his left ankle/foot weeks ago.  He did go to podiatrist last week-he was given a brace to wear now.  He was told if not better could get cortisol injection.      He saw ENT which really helped his allergies/post nasal drainage.  He has a nasal rinse that has really helped-he is breathing better.      He did cologuard late last year-it was negative.  He has no GI or bowel issues.      Review of Systems   Constitutional:  Negative for activity change, appetite change, chills, diaphoresis, fatigue, fever and unexpected weight change.   HENT:  Positive for hearing loss. Negative for congestion, dental problem, drooling, ear discharge, ear pain, facial swelling, mouth sores, nosebleeds, postnasal drip, rhinorrhea, sinus pressure, sinus pain, sneezing, sore throat, tinnitus, trouble swallowing and voice change.    Eyes:  Negative for photophobia, pain, discharge, redness, itching and visual disturbance (no issues).   Respiratory:  Negative for apnea, cough, choking, chest tightness, shortness of breath, wheezing and stridor.    Cardiovascular:  Negative for chest pain, palpitations and leg swelling.   Gastrointestinal:  Negative for abdominal distention, abdominal pain, anal bleeding, blood in stool, constipation, diarrhea, nausea, rectal pain and vomiting.   Endocrine: Negative for cold intolerance, heat intolerance, polydipsia, polyphagia and polyuria.   Genitourinary:  Negative for decreased urine volume, difficulty urinating, dysuria, enuresis, flank pain, frequency, genital sores, hematuria, penile discharge, penile pain, penile swelling, scrotal swelling, testicular pain and urgency.        Nocturia    Musculoskeletal:  Positive for arthralgias (left foot). Negative for back pain, gait problem, joint swelling, myalgias, neck pain and  "neck stiffness.   Skin:  Negative for color change, pallor, rash and wound.   Allergic/Immunologic: Positive for environmental allergies. Negative for food allergies and immunocompromised state.   Neurological:  Negative for dizziness, tremors, seizures, syncope, facial asymmetry, speech difficulty, weakness, light-headedness, numbness and headaches.   Hematological:  Negative for adenopathy. Does not bruise/bleed easily.   Psychiatric/Behavioral:  Negative for agitation, behavioral problems, confusion, decreased concentration, dysphoric mood, hallucinations, self-injury, sleep disturbance and suicidal ideas. The patient is not nervous/anxious and is not hyperactive.        Objective   /80 (BP Location: Left arm, Patient Position: Sitting)   Pulse 60   Resp 16   Ht 1.81 m (5' 11.25\")   Wt 81.7 kg (180 lb 3.2 oz)   BMI 24.96 kg/m²    Physical Exam  Constitutional:       Appearance: Normal appearance.   HENT:      Head: Normocephalic and atraumatic.      Right Ear: Tympanic membrane, ear canal and external ear normal. There is no impacted cerumen.      Left Ear: Tympanic membrane, ear canal and external ear normal. There is no impacted cerumen.      Nose: Nose normal. No congestion or rhinorrhea.      Mouth/Throat:      Mouth: Mucous membranes are moist.      Pharynx: Oropharynx is clear. No oropharyngeal exudate or posterior oropharyngeal erythema.   Eyes:      Extraocular Movements: Extraocular movements intact.      Conjunctiva/sclera: Conjunctivae normal.      Pupils: Pupils are equal, round, and reactive to light.   Neck:      Vascular: No carotid bruit.   Cardiovascular:      Rate and Rhythm: Normal rate and regular rhythm.      Pulses: Normal pulses.      Heart sounds: Normal heart sounds. No murmur heard.  Pulmonary:      Effort: Pulmonary effort is normal. No respiratory distress.      Breath sounds: Normal breath sounds. No wheezing, rhonchi or rales.   Abdominal:      General: Abdomen is flat. " Bowel sounds are normal. There is no distension.      Palpations: Abdomen is soft.      Tenderness: There is no abdominal tenderness.      Hernia: No hernia is present.   Musculoskeletal:         General: No swelling or tenderness. Normal range of motion.      Cervical back: Normal range of motion and neck supple.      Right lower leg: No edema.      Left lower leg: No edema.   Lymphadenopathy:      Cervical: No cervical adenopathy.   Skin:     General: Skin is warm and dry.      Findings: No lesion or rash.   Neurological:      General: No focal deficit present.      Mental Status: He is alert and oriented to person, place, and time.      Cranial Nerves: No cranial nerve deficit.      Sensory: No sensory deficit.      Motor: No weakness.   Psychiatric:         Mood and Affect: Mood normal.         Behavior: Behavior normal.         Thought Content: Thought content normal.         Judgment: Judgment normal.         Assessment/Plan   Problem List Items Addressed This Visit       Asthma ([facility])     No current issues          Gynecomastia, male     Uses tamoxifen to regulate-admits to taking trials/periods off it          Male pattern baldness    Allergic rhinitis     Improved with new nasal spray and current regimen   Saw ENT         Tinnitus of right ear     Notes some hearing changes as well  Recommend audiometry testing if he notes becoming more of an issue          Gastroesophageal reflux disease     Uses PPI therapy  Can attempt to use every other day if able to manage symptoms           Other Visit Diagnoses       Routine general medical examination at a health care facility    -  Primary    Relevant Orders    Comprehensive Metabolic Panel    CBC    Urinalysis with Reflex Microscopic    Follow Up In Primary Care - Health Maintenance    Elevated LDL cholesterol level        Relevant Orders    Lipid Panel    Screening for thyroid disorder        Relevant Orders    TSH with reflex to Free T4 if abnormal

## 2024-05-29 NOTE — ASSESSMENT & PLAN NOTE
Notes some hearing changes as well  Recommend audiometry testing if he notes becoming more of an issue

## 2024-05-29 NOTE — PATIENT INSTRUCTIONS
Continue to work on healthy diet and exercise as able  Get fasting blood work and urine done when able (orders are in-no appointment needed at any  lab)  Continue all meds as directed including nasal sprays (rinse mouth out after use)  Monitor urinary symptoms but try to lessen consumption leading up to bed (stop consumption 2 hours prior to lessen overnight wake ups)  Follow up in 1 year-sooner if needed

## 2024-07-02 ENCOUNTER — APPOINTMENT (OUTPATIENT)
Dept: ENDOCRINOLOGY | Facility: CLINIC | Age: 44
End: 2024-07-02
Payer: COMMERCIAL

## 2024-07-10 ENCOUNTER — APPOINTMENT (OUTPATIENT)
Dept: PRIMARY CARE | Facility: CLINIC | Age: 44
End: 2024-07-10
Payer: COMMERCIAL

## 2024-07-12 ENCOUNTER — LAB (OUTPATIENT)
Dept: LAB | Facility: LAB | Age: 44
End: 2024-07-12
Payer: COMMERCIAL

## 2024-07-12 DIAGNOSIS — E78.00 ELEVATED LDL CHOLESTEROL LEVEL: ICD-10-CM

## 2024-07-12 DIAGNOSIS — Z00.00 ROUTINE GENERAL MEDICAL EXAMINATION AT A HEALTH CARE FACILITY: ICD-10-CM

## 2024-07-12 DIAGNOSIS — Z13.29 SCREENING FOR THYROID DISORDER: ICD-10-CM

## 2024-07-12 LAB
ALBUMIN SERPL BCP-MCNC: 4.9 G/DL (ref 3.4–5)
ALP SERPL-CCNC: 68 U/L (ref 33–120)
ALT SERPL W P-5'-P-CCNC: 19 U/L (ref 10–52)
ANION GAP SERPL CALC-SCNC: 13 MMOL/L (ref 10–20)
APPEARANCE UR: CLEAR
AST SERPL W P-5'-P-CCNC: 17 U/L (ref 9–39)
BILIRUB SERPL-MCNC: 0.9 MG/DL (ref 0–1.2)
BILIRUB UR STRIP.AUTO-MCNC: NEGATIVE MG/DL
BUN SERPL-MCNC: 30 MG/DL (ref 6–23)
CALCIUM SERPL-MCNC: 9.3 MG/DL (ref 8.6–10.6)
CHLORIDE SERPL-SCNC: 103 MMOL/L (ref 98–107)
CHOLEST SERPL-MCNC: 163 MG/DL (ref 0–199)
CHOLESTEROL/HDL RATIO: 3
CO2 SERPL-SCNC: 25 MMOL/L (ref 21–32)
COLOR UR: NORMAL
CREAT SERPL-MCNC: 1.08 MG/DL (ref 0.5–1.3)
EGFRCR SERPLBLD CKD-EPI 2021: 86 ML/MIN/1.73M*2
ERYTHROCYTE [DISTWIDTH] IN BLOOD BY AUTOMATED COUNT: 13 % (ref 11.5–14.5)
GLUCOSE SERPL-MCNC: 99 MG/DL (ref 74–99)
GLUCOSE UR STRIP.AUTO-MCNC: NORMAL MG/DL
HCT VFR BLD AUTO: 45.2 % (ref 41–52)
HDLC SERPL-MCNC: 54.4 MG/DL
HGB BLD-MCNC: 15.8 G/DL (ref 13.5–17.5)
KETONES UR STRIP.AUTO-MCNC: NEGATIVE MG/DL
LDLC SERPL CALC-MCNC: 99 MG/DL
LEUKOCYTE ESTERASE UR QL STRIP.AUTO: NEGATIVE
MCH RBC QN AUTO: 31.7 PG (ref 26–34)
MCHC RBC AUTO-ENTMCNC: 35 G/DL (ref 32–36)
MCV RBC AUTO: 91 FL (ref 80–100)
NITRITE UR QL STRIP.AUTO: NEGATIVE
NON HDL CHOLESTEROL: 109 MG/DL (ref 0–149)
NRBC BLD-RTO: 0 /100 WBCS (ref 0–0)
PH UR STRIP.AUTO: 6.5 [PH]
PLATELET # BLD AUTO: 145 X10*3/UL (ref 150–450)
POTASSIUM SERPL-SCNC: 4.2 MMOL/L (ref 3.5–5.3)
PROT SERPL-MCNC: 7.2 G/DL (ref 6.4–8.2)
PROT UR STRIP.AUTO-MCNC: NEGATIVE MG/DL
RBC # BLD AUTO: 4.99 X10*6/UL (ref 4.5–5.9)
RBC # UR STRIP.AUTO: NEGATIVE /UL
SODIUM SERPL-SCNC: 137 MMOL/L (ref 136–145)
SP GR UR STRIP.AUTO: 1.03
TRIGL SERPL-MCNC: 49 MG/DL (ref 0–149)
TSH SERPL-ACNC: 2.28 MIU/L (ref 0.44–3.98)
UROBILINOGEN UR STRIP.AUTO-MCNC: NORMAL MG/DL
VLDL: 10 MG/DL (ref 0–40)
WBC # BLD AUTO: 3.3 X10*3/UL (ref 4.4–11.3)

## 2024-07-12 PROCEDURE — 85027 COMPLETE CBC AUTOMATED: CPT

## 2024-07-12 PROCEDURE — 84443 ASSAY THYROID STIM HORMONE: CPT

## 2024-07-12 PROCEDURE — 81003 URINALYSIS AUTO W/O SCOPE: CPT

## 2024-07-12 PROCEDURE — 36415 COLL VENOUS BLD VENIPUNCTURE: CPT

## 2024-07-12 PROCEDURE — 80053 COMPREHEN METABOLIC PANEL: CPT

## 2024-07-12 PROCEDURE — 80061 LIPID PANEL: CPT

## 2024-08-27 ENCOUNTER — APPOINTMENT (OUTPATIENT)
Dept: ENDOCRINOLOGY | Facility: CLINIC | Age: 44
End: 2024-08-27
Payer: COMMERCIAL

## 2024-09-09 DIAGNOSIS — N62 GYNECOMASTIA, MALE: ICD-10-CM

## 2024-09-10 RX ORDER — TAMOXIFEN CITRATE 20 MG/1
20 TABLET ORAL DAILY
Qty: 90 TABLET | Refills: 3 | Status: SHIPPED | OUTPATIENT
Start: 2024-09-10

## 2024-09-12 ENCOUNTER — OFFICE VISIT (OUTPATIENT)
Dept: PRIMARY CARE | Facility: CLINIC | Age: 44
End: 2024-09-12
Payer: COMMERCIAL

## 2024-09-12 VITALS
RESPIRATION RATE: 18 BRPM | WEIGHT: 182.6 LBS | BODY MASS INDEX: 25.29 KG/M2 | OXYGEN SATURATION: 97 % | HEART RATE: 75 BPM | SYSTOLIC BLOOD PRESSURE: 121 MMHG | TEMPERATURE: 97.7 F | DIASTOLIC BLOOD PRESSURE: 75 MMHG

## 2024-09-12 DIAGNOSIS — H66.93 BACTERIAL INFECTION OF BOTH EARS: Primary | ICD-10-CM

## 2024-09-12 DIAGNOSIS — B07.0 PLANTAR WART OF LEFT FOOT: ICD-10-CM

## 2024-09-12 DIAGNOSIS — B96.89 BACTERIAL INFECTION OF BOTH EARS: Primary | ICD-10-CM

## 2024-09-12 PROCEDURE — 99213 OFFICE O/P EST LOW 20 MIN: CPT | Performed by: INTERNAL MEDICINE

## 2024-09-12 RX ORDER — AMOXICILLIN AND CLAVULANATE POTASSIUM 875; 125 MG/1; MG/1
875 TABLET, FILM COATED ORAL 2 TIMES DAILY
Qty: 14 TABLET | Refills: 0 | Status: SHIPPED | OUTPATIENT
Start: 2024-09-12 | End: 2024-09-19

## 2024-09-12 ASSESSMENT — ENCOUNTER SYMPTOMS
CHEST TIGHTNESS: 0
RHINORRHEA: 0
WHEEZING: 0
DIZZINESS: 0
COUGH: 0
SINUS PAIN: 0
FEVER: 0
LIGHT-HEADEDNESS: 0
FATIGUE: 0
CHILLS: 0
PALPITATIONS: 0
SORE THROAT: 0
SHORTNESS OF BREATH: 0
SINUS PRESSURE: 0
HEADACHES: 0

## 2024-09-12 ASSESSMENT — PATIENT HEALTH QUESTIONNAIRE - PHQ9
1. LITTLE INTEREST OR PLEASURE IN DOING THINGS: NOT AT ALL
2. FEELING DOWN, DEPRESSED OR HOPELESS: NOT AT ALL
SUM OF ALL RESPONSES TO PHQ9 QUESTIONS 1 AND 2: 0

## 2024-09-12 NOTE — PATIENT INSTRUCTIONS
I sent in Augmentin to start use and take for full 7 days  Would continue nasal sprays as able   Consider oral decongestant if able to tolerate (claritin-d)   Rest, push fluids  For the foot start otc acid to area to treat as plantar wart-monitor for now; if not getting better after 6 weeks let us know   Follow up based on results

## 2024-09-12 NOTE — PROGRESS NOTES
Subjective   Patient ID: Madhav Rodriguez is a 43 y.o. male who presents for sinus pressure.    HPI  Pt here in acute visit.  Started 11 days ago with a lot of nasal congestion, post nasal drainage.  His ears are plugged.  He doesn't have pressure in his face but he is using his Atrovent and mometasone sprays without relief.  No fevers/chills.  He did test for Covid x 2 which were negative.      He tells me he stepped on a nail 1 year ago and at that time got a tetanus shot.  Now about 1 month ago in the area he stepped on the nail he has noted some skin changes.  The area is also a bit tender at times.      Review of Systems   Constitutional:  Negative for chills, fatigue and fever.   HENT:  Positive for congestion, ear pain and postnasal drip. Negative for ear discharge, rhinorrhea, sinus pressure, sinus pain and sore throat.    Respiratory:  Negative for cough, chest tightness, shortness of breath and wheezing.    Cardiovascular:  Negative for chest pain, palpitations and leg swelling.   Neurological:  Negative for dizziness, light-headedness and headaches.       Objective   /75 (BP Location: Left arm, Patient Position: Sitting)   Pulse 75   Temp 36.5 °C (97.7 °F)   Resp 18   Wt 82.8 kg (182 lb 9.6 oz)   SpO2 97%   BMI 25.29 kg/m²      Physical Exam  Constitutional:       Appearance: Normal appearance.   HENT:      Right Ear: Ear canal and external ear normal.      Left Ear: Ear canal and external ear normal.      Ears:      Comments: Bilateral red/bulging TM's      Nose: Congestion present.      Mouth/Throat:      Pharynx: Posterior oropharyngeal erythema present. No oropharyngeal exudate.   Cardiovascular:      Rate and Rhythm: Normal rate and regular rhythm.      Heart sounds: Normal heart sounds.   Pulmonary:      Effort: Pulmonary effort is normal.      Breath sounds: Normal breath sounds.   Lymphadenopathy:      Cervical: No cervical adenopathy.   Skin:     Findings: Lesion (left plantar surface  lesion noted mid foot with disruption of foot prints more consistent with plantar wart with satelite lesion noted as well) present.   Neurological:      Mental Status: He is alert and oriented to person, place, and time.   Psychiatric:         Mood and Affect: Mood normal.         Assessment/Plan   Problem List Items Addressed This Visit    None  Visit Diagnoses       Bacterial infection of both ears    -  Primary    Relevant Medications    amoxicillin-pot clavulanate (Augmentin) 875-125 mg tablet    Plantar wart of left foot

## 2024-09-24 DIAGNOSIS — J30.9 ALLERGIC RHINITIS, UNSPECIFIED: ICD-10-CM

## 2024-09-25 RX ORDER — MONTELUKAST SODIUM 10 MG/1
10 TABLET ORAL NIGHTLY
Qty: 90 TABLET | Refills: 3 | Status: SHIPPED | OUTPATIENT
Start: 2024-09-25

## 2025-01-07 DIAGNOSIS — J30.9 ALLERGIC RHINITIS, UNSPECIFIED: ICD-10-CM

## 2025-01-07 RX ORDER — MONTELUKAST SODIUM 10 MG/1
10 TABLET ORAL NIGHTLY
Qty: 90 TABLET | Refills: 3 | Status: SHIPPED | OUTPATIENT
Start: 2025-01-07

## 2025-03-04 DIAGNOSIS — R09.81 NASAL CONGESTION: ICD-10-CM

## 2025-03-04 RX ORDER — MOMETASONE FUROATE 100 %
POWDER (GRAM) MISCELLANEOUS
Qty: 60 G | Refills: 0 | Status: SHIPPED | OUTPATIENT
Start: 2025-03-04

## 2025-03-04 NOTE — TELEPHONE ENCOUNTER
Patient contacted the office requesting a refill for Mometasone nasal irrigations. Discussed with Dr. Sheffield and order placed for 1 month refills of mometasone. Patient advised he will need to be seen for future refills. Appointment scheduled for 4/02/25. Nasal irrigations ordered. Order placed to [facility] for Mometasone 1 mg twice a day per nasal irrigation for 30 days with 0 refills. Patient notified compounding pharmacy will call patient for further instruction and to obtain additional information. Patient instructed to call with any further questions

## 2025-03-19 ASSESSMENT — ENCOUNTER SYMPTOMS
RHINORRHEA: 1
HEARTBURN: 0
SWEATS: 0
SHORTNESS OF BREATH: 1
HEMOPTYSIS: 0
COUGH: 1
MYALGIAS: 0
WHEEZING: 0
CHILLS: 0
WEIGHT LOSS: 0
FEVER: 0
SORE THROAT: 0
HEADACHES: 0

## 2025-03-20 ENCOUNTER — OFFICE VISIT (OUTPATIENT)
Dept: PRIMARY CARE | Facility: CLINIC | Age: 45
End: 2025-03-20
Payer: COMMERCIAL

## 2025-03-20 VITALS
TEMPERATURE: 98.6 F | OXYGEN SATURATION: 97 % | RESPIRATION RATE: 16 BRPM | HEART RATE: 64 BPM | SYSTOLIC BLOOD PRESSURE: 129 MMHG | DIASTOLIC BLOOD PRESSURE: 77 MMHG

## 2025-03-20 DIAGNOSIS — J45.42 MODERATE PERSISTENT REACTIVE AIRWAY DISEASE WITH STATUS ASTHMATICUS (HHS-HCC): ICD-10-CM

## 2025-03-20 DIAGNOSIS — J98.01 BRONCHOSPASM: Primary | ICD-10-CM

## 2025-03-20 PROCEDURE — 99213 OFFICE O/P EST LOW 20 MIN: CPT | Performed by: INTERNAL MEDICINE

## 2025-03-20 RX ORDER — HYDROCODONE BITARTRATE AND HOMATROPINE METHYLBROMIDE ORAL SOLUTION 5; 1.5 MG/5ML; MG/5ML
5 LIQUID ORAL EVERY 6 HOURS PRN
Qty: 100 ML | Refills: 0 | Status: SHIPPED | OUTPATIENT
Start: 2025-03-20 | End: 2025-03-25

## 2025-03-20 RX ORDER — PREDNISONE 20 MG/1
40 TABLET ORAL DAILY
Qty: 10 TABLET | Refills: 0 | Status: SHIPPED | OUTPATIENT
Start: 2025-03-20 | End: 2025-03-25

## 2025-03-20 RX ORDER — ALBUTEROL SULFATE 90 UG/1
2 INHALANT RESPIRATORY (INHALATION) EVERY 6 HOURS PRN
COMMUNITY
End: 2025-03-20 | Stop reason: ALTCHOICE

## 2025-03-20 ASSESSMENT — ENCOUNTER SYMPTOMS
HEADACHES: 0
LIGHT-HEADEDNESS: 0
HEARTBURN: 0
SHORTNESS OF BREATH: 1
WEIGHT LOSS: 0
DIZZINESS: 0
CHILLS: 0
RHINORRHEA: 1
FEVER: 0
SORE THROAT: 0
COUGH: 1
SWEATS: 0
MYALGIAS: 0
HEMOPTYSIS: 0
WHEEZING: 0

## 2025-03-20 ASSESSMENT — PATIENT HEALTH QUESTIONNAIRE - PHQ9
1. LITTLE INTEREST OR PLEASURE IN DOING THINGS: NOT AT ALL
SUM OF ALL RESPONSES TO PHQ9 QUESTIONS 1 AND 2: 0
2. FEELING DOWN, DEPRESSED OR HOPELESS: NOT AT ALL

## 2025-03-20 NOTE — PATIENT INSTRUCTIONS
Use the cough syrup especially for bedtime  Take the prednisone 2 tablets daily for next 5 days  Use Airsupra inhaler 1-2 puffs every 6 hours as needed for chest tightness/wheezing/short of breath  Continue Singulair night  Would consider starting antihistamine like claritin or zyrtec daily  Follow up based on results

## 2025-03-20 NOTE — PROGRESS NOTES
Subjective   Patient ID: Madhav Rodriguez is a 44 y.o. male who presents for URI (Symptoms for 8 days. Cough and nasal drainage ).    Cough  This is a new problem. The current episode started 1 to 4 weeks ago. The problem has been waxing and waning. The problem occurs every few minutes. The cough is Non-productive. Associated symptoms include chest pain, postnasal drip, rhinorrhea and shortness of breath. Pertinent negatives include no chills, ear congestion, ear pain, fever, headaches, heartburn, hemoptysis, myalgias, nasal congestion, rash, sore throat, sweats, weight loss or wheezing. The symptoms are aggravated by lying down.     Pt here in acute visit.  He started with increase drainage about 8 days ago.  He gets a lot of post nasal drainage and then bronchial spasms that causes him to want to cough non stop.  He is barely sleeping.  He found old prednisone and took that-10 mg.  He got out his nebulizer and did a treatment but only lasts for few hours.  He is using his mometasone rinse twice a day right now.  No fever/chills.  Cough is non productive.  No facial pain/pressure.      Review of Systems   Constitutional:  Negative for chills, fever and weight loss.   HENT:  Positive for postnasal drip and rhinorrhea. Negative for congestion, ear pain and sore throat.    Respiratory:  Positive for cough and shortness of breath. Negative for hemoptysis and wheezing.    Cardiovascular:  Positive for chest pain.   Gastrointestinal:  Negative for heartburn.   Musculoskeletal:  Negative for myalgias.   Skin:  Negative for rash.   Neurological:  Negative for dizziness, light-headedness and headaches.       Objective   /77 (BP Location: Right arm, Patient Position: Sitting)   Pulse 64   Temp 37 °C (98.6 °F) (Oral)   Resp 16   SpO2 97%      Physical Exam  Constitutional:       Appearance: Normal appearance.   HENT:      Right Ear: Tympanic membrane normal.      Left Ear: Tympanic membrane normal.      Nose: Nose  normal. No congestion.      Mouth/Throat:      Pharynx: Posterior oropharyngeal erythema (minor redness) present. No oropharyngeal exudate.   Cardiovascular:      Rate and Rhythm: Normal rate and regular rhythm.      Heart sounds: Normal heart sounds.   Pulmonary:      Effort: Pulmonary effort is normal. No respiratory distress.      Breath sounds: Normal breath sounds. No stridor. No wheezing, rhonchi or rales.   Chest:      Chest wall: No tenderness.   Lymphadenopathy:      Cervical: No cervical adenopathy.   Neurological:      Mental Status: He is alert and oriented to person, place, and time.         Assessment/Plan   Problem List Items Addressed This Visit    None  Visit Diagnoses       Bronchospasm    -  Primary    Relevant Medications    hydrocodone-homatropine (Hycodan) 5-1.5 mg/5 mL syrup    predniSONE (Deltasone) 20 mg tablet    albuterol-budesonide (Airsupra) 90-80 mcg/actuation inhaler    Moderate persistent reactive airway disease with status asthmaticus ([facility])        Relevant Medications    hydrocodone-homatropine (Hycodan) 5-1.5 mg/5 mL syrup    albuterol-budesonide (Airsupra) 90-80 mcg/actuation inhaler

## 2025-03-21 ENCOUNTER — TELEPHONE (OUTPATIENT)
Dept: PRIMARY CARE | Facility: CLINIC | Age: 45
End: 2025-03-21
Payer: COMMERCIAL

## 2025-03-21 NOTE — TELEPHONE ENCOUNTER
Yes. I told him he has the sample but he believes there is only 4 days left, will that be okay? What if he needs more, we do not have another sample. Those samples are mailed to the office.

## 2025-03-21 NOTE — TELEPHONE ENCOUNTER
Would use the sample for now and if no better after 4 days of use plus the oral steroids, etc then have him call  Dr. Coronel can then decide what other inhaler is needed or treatment if continued with symptoms

## 2025-03-21 NOTE — TELEPHONE ENCOUNTER
Madhav spoke with his Insurance and Airsupra is not on his formulary list, these are the options to choose from:      Breyna  Budesonide-formoterol  Fluticasone  Wixela Inhub

## 2025-03-21 NOTE — TELEPHONE ENCOUNTER
He got airsupra sample from our office-doesn't need it from insurance  The only things he needed to get from pharmacy was the oral prednisone and the cough syrup

## 2025-04-02 ENCOUNTER — APPOINTMENT (OUTPATIENT)
Dept: OTOLARYNGOLOGY | Facility: CLINIC | Age: 45
End: 2025-04-02
Payer: COMMERCIAL

## 2025-04-14 DIAGNOSIS — L64.9 ANDROGENIC ALOPECIA, UNSPECIFIED: ICD-10-CM

## 2025-04-14 RX ORDER — FINASTERIDE 1 MG/1
1 TABLET, FILM COATED ORAL DAILY
Qty: 90 TABLET | Refills: 1 | Status: SHIPPED | OUTPATIENT
Start: 2025-04-14

## 2025-04-21 NOTE — PROGRESS NOTES
Referring Provider: No ref. provider found    History of Present Illness:    Madhav Rodriguez is a 44 y.o. male, presenting for follow up of PND.  He does have a history of allergies and states he was on allergy shots around ten or more years ago. This did help but did not resolve his PND however, he stopped these due to side effects. Reports being allergic to dust mites, pollen, and multiple other outdoor seasonal allergies. He has tried Azelastine in the past and multiple OTC oral antihistamines without relief or improvement in symptoms.     Today Madhav Rodriguez reports he was unable to afford his CT scan as his daughter had unexpected knee surgery.  He reports that the results of his mometasone irrigations are satisfactory and he is quite pleased with the results.   ?  Review of Systems:    Review of symptoms was negative except for those stated including Cardiopulmonary, Genitourinary, Gastrointestinal, Psychological, Sleep pattern, Endocrine, Eyes, Neurologic, Musculoskeletal, Skin, Hematologic/Lymphatic and Allergic/Immunologic.     Medical History:    I have reviewed the patient's updated past medical history, surgical history, family history, social history, as well as current medications and allergies as of 4/23/2025. Changes to these items have been updated and marked as reviewed in the electronic medical record.    Physical Exam:    Vitals:  vitals were not taken for this visit.   General: Patient doing well overall and is in no apparent distress.  Psych: Pleasant affect, and answers questions appropriately.  Head & Face: Symmetric facial movements  Eyes: Pupils equal, round, reactive.  Extraocular movements intact without gaze restrictions or nystagmus. No epiphora.  Ears:  External auditory canals are normal.  Tympanic membranes are clear.  No middle ear effusion is seen.  All middle ear landmarks are normal.  Nose: Anterior rhinoscopy revealed normal sinonasal mucosa. More posterior areas of the nasal  cavity could not be completely examined.  Oral Cavity/Oropharynx:  Without lesions or masses to visual exam.  Neck: Supple without lymphadenopathy.  Lungs: Non-labored, and without evidence of stridor.  Cardiac: Pulses are strong, well-perfused.  Extremities: Without gross evidence of clubbing, cyanosis, or edema.  Neuro: Cranial nerves II-XII grossly intact; Intact facial movements.    Assessment:     Madhav Rodriguez is a 44 y.o. male with chronic sinusitis, responsive to topical mometasone doing great.    Plan:      Madhav is doing well, so we will continue topical mometasone once daily which is being sent to [facility]. I did recommend getting the CT scan completed if he has a change in symptoms. The patient agreed with the plan. We will follow up in 1 year.       Yelitza Sheffield MD, M.Eng.   of Otolaryngology - Head & Neck Surgery  Division of Rhinology and Endoscopic Skull Base Surgery  [facility]

## 2025-04-23 ENCOUNTER — APPOINTMENT (OUTPATIENT)
Dept: OTOLARYNGOLOGY | Facility: CLINIC | Age: 45
End: 2025-04-23
Payer: COMMERCIAL

## 2025-04-23 VITALS — BODY MASS INDEX: 25.21 KG/M2 | WEIGHT: 182 LBS

## 2025-04-23 DIAGNOSIS — R09.81 NASAL CONGESTION: ICD-10-CM

## 2025-04-23 DIAGNOSIS — J31.0 CHRONIC RHINITIS: ICD-10-CM

## 2025-04-23 DIAGNOSIS — R04.0 EPISTAXIS: Primary | ICD-10-CM

## 2025-04-23 DIAGNOSIS — J30.0 VASOMOTOR RHINITIS: ICD-10-CM

## 2025-04-23 PROCEDURE — 99213 OFFICE O/P EST LOW 20 MIN: CPT | Performed by: OTOLARYNGOLOGY

## 2025-04-23 PROCEDURE — 1036F TOBACCO NON-USER: CPT | Performed by: OTOLARYNGOLOGY

## 2025-04-23 RX ORDER — MOMETASONE FUROATE 100 %
POWDER (GRAM) MISCELLANEOUS
Qty: 180 G | Refills: 0 | Status: SHIPPED | OUTPATIENT
Start: 2025-04-23

## 2025-06-02 ENCOUNTER — APPOINTMENT (OUTPATIENT)
Dept: PRIMARY CARE | Facility: CLINIC | Age: 45
End: 2025-06-02
Payer: COMMERCIAL

## 2025-06-02 VITALS
OXYGEN SATURATION: 98 % | DIASTOLIC BLOOD PRESSURE: 70 MMHG | HEIGHT: 71 IN | HEART RATE: 71 BPM | WEIGHT: 174.9 LBS | SYSTOLIC BLOOD PRESSURE: 128 MMHG | BODY MASS INDEX: 24.48 KG/M2 | RESPIRATION RATE: 12 BRPM | TEMPERATURE: 97.4 F

## 2025-06-02 DIAGNOSIS — R69 TRAVEL-RELATED ILLNESS: ICD-10-CM

## 2025-06-02 DIAGNOSIS — N62 GYNECOMASTIA, MALE: ICD-10-CM

## 2025-06-02 DIAGNOSIS — Z12.5 SCREENING FOR PROSTATE CANCER: ICD-10-CM

## 2025-06-02 DIAGNOSIS — D72.819 LEUKOPENIA, UNSPECIFIED TYPE: ICD-10-CM

## 2025-06-02 DIAGNOSIS — Z00.00 ROUTINE GENERAL MEDICAL EXAMINATION AT A HEALTH CARE FACILITY: Primary | ICD-10-CM

## 2025-06-02 DIAGNOSIS — F41.8 SITUATIONAL ANXIETY: ICD-10-CM

## 2025-06-02 DIAGNOSIS — K21.9 GASTROESOPHAGEAL REFLUX DISEASE WITHOUT ESOPHAGITIS: ICD-10-CM

## 2025-06-02 DIAGNOSIS — Z13.220 SCREENING FOR LIPID DISORDERS: ICD-10-CM

## 2025-06-02 DIAGNOSIS — A09 TRAVELER'S DIARRHEA: ICD-10-CM

## 2025-06-02 DIAGNOSIS — Z13.29 SCREENING FOR THYROID DISORDER: ICD-10-CM

## 2025-06-02 DIAGNOSIS — M25.552 PAIN OF LEFT HIP: ICD-10-CM

## 2025-06-02 DIAGNOSIS — J45.20 MILD INTERMITTENT ASTHMA WITHOUT COMPLICATION (HHS-HCC): ICD-10-CM

## 2025-06-02 PROCEDURE — 99396 PREV VISIT EST AGE 40-64: CPT | Performed by: INTERNAL MEDICINE

## 2025-06-02 PROCEDURE — 1036F TOBACCO NON-USER: CPT | Performed by: INTERNAL MEDICINE

## 2025-06-02 PROCEDURE — 3008F BODY MASS INDEX DOCD: CPT | Performed by: INTERNAL MEDICINE

## 2025-06-02 RX ORDER — MELOXICAM 15 MG/1
15 TABLET ORAL DAILY PRN
COMMUNITY

## 2025-06-02 RX ORDER — AZITHROMYCIN 250 MG/1
TABLET, FILM COATED ORAL
Qty: 1 TABLET | Refills: 0 | Status: SHIPPED | OUTPATIENT
Start: 2025-06-02

## 2025-06-02 RX ORDER — FAMOTIDINE 20 MG/1
20 TABLET, FILM COATED ORAL 2 TIMES DAILY PRN
Qty: 60 TABLET | Refills: 0 | Status: SHIPPED | OUTPATIENT
Start: 2025-06-02 | End: 2025-11-29

## 2025-06-02 ASSESSMENT — ENCOUNTER SYMPTOMS
CONSTIPATION: 0
EYE REDNESS: 0
MYALGIAS: 0
PALPITATIONS: 0
EYE ITCHING: 0
AGITATION: 0
HYPERACTIVE: 0
NECK PAIN: 0
UNEXPECTED WEIGHT CHANGE: 0
WOUND: 0
STRIDOR: 0
FACIAL ASYMMETRY: 0
COLOR CHANGE: 0
ROS GI COMMENTS: +GERD
TROUBLE SWALLOWING: 0
ABDOMINAL DISTENTION: 0
CHILLS: 0
ABDOMINAL PAIN: 0
DIAPHORESIS: 0
DIARRHEA: 0
EYE PAIN: 0
NAUSEA: 0
HEMATURIA: 0
ACTIVITY CHANGE: 0
PHOTOPHOBIA: 0
RHINORRHEA: 0
SPEECH DIFFICULTY: 0
NERVOUS/ANXIOUS: 0
RECTAL PAIN: 0
SEIZURES: 0
BLOOD IN STOOL: 0
CHOKING: 0
SLEEP DISTURBANCE: 1
SORE THROAT: 0
BRUISES/BLEEDS EASILY: 0
SHORTNESS OF BREATH: 0
DIFFICULTY URINATING: 0
ARTHRALGIAS: 1
POLYDIPSIA: 0
FATIGUE: 0
VOICE CHANGE: 0
DYSPHORIC MOOD: 0
CHEST TIGHTNESS: 0
VOMITING: 0
DYSURIA: 0
CONFUSION: 0
FLANK PAIN: 0
DIZZINESS: 0
APNEA: 0
WEAKNESS: 0
SINUS PRESSURE: 0
DECREASED CONCENTRATION: 0
LIGHT-HEADEDNESS: 0
TREMORS: 0
HEADACHES: 0
ANAL BLEEDING: 0
FACIAL SWELLING: 0
HALLUCINATIONS: 0
POLYPHAGIA: 0
BACK PAIN: 1
SINUS PAIN: 0
COUGH: 0
WHEEZING: 0
ADENOPATHY: 0
FREQUENCY: 0
NECK STIFFNESS: 0
NUMBNESS: 0
JOINT SWELLING: 0
EYE DISCHARGE: 0
APPETITE CHANGE: 0
FEVER: 0

## 2025-06-02 NOTE — PATIENT INSTRUCTIONS
Instead of omeprazole can try famotidine 20 mg (pepcid) 1-2 times a day as needed for acid reflux  Continue other meds as directed  Get fasting blood work and urine done when able-orders given  Wrist is likely overuse injury-ice area, ibuprofen or meloxicam, compression and rest   For the hip see ortho as directed  Continue healthy diet and exercise regularly  Follow up here in 1 year-sooner if needed

## 2025-06-02 NOTE — ASSESSMENT & PLAN NOTE
Had moderate OA of hips at time of labral repairs  Having worsening pain of left hip  Will see ortho end of month   Uses Meloxicam PRN

## 2025-06-02 NOTE — ASSESSMENT & PLAN NOTE
He uses PPI PRN but prefer something with less possible side effects  Rx for famotidine 20 mg given to use 1-2 tablets as needed

## 2025-06-02 NOTE — PROGRESS NOTES
Subjective   Patient ID: Madhav Rodriguez is a 44 y.o. male who presents for Annual Exam.    HPI  Pt here for physical.  He is down in weight since last year.  He exercises regularly-he is trying to cut back on alcohol.      He is having hip pain-uses Meloxicam as needed.  Has an upcoming appt with ortho end of month.  He cannot sleep on it (left hip).      He did a cologuard in 2023-that was negative.  No bowel issues.  He has GERD and is on PPI.      Review of Systems   Constitutional:  Negative for activity change, appetite change, chills, diaphoresis, fatigue, fever and unexpected weight change.   HENT:  Negative for congestion, dental problem, drooling, ear discharge, ear pain, facial swelling, hearing loss, mouth sores, nosebleeds, postnasal drip, rhinorrhea, sinus pressure, sinus pain, sneezing, sore throat, tinnitus, trouble swallowing and voice change.    Eyes:  Negative for photophobia, pain, discharge, redness, itching and visual disturbance.   Respiratory:  Negative for apnea, cough, choking, chest tightness, shortness of breath, wheezing and stridor.    Cardiovascular:  Negative for chest pain, palpitations and leg swelling.   Gastrointestinal:  Negative for abdominal distention, abdominal pain, anal bleeding, blood in stool, constipation, diarrhea, nausea, rectal pain and vomiting.        +GERD    Endocrine: Negative for cold intolerance, heat intolerance, polydipsia, polyphagia and polyuria.   Genitourinary:  Negative for decreased urine volume, difficulty urinating, dysuria, enuresis, flank pain, frequency, genital sores, hematuria, penile discharge, penile pain, penile swelling, scrotal swelling, testicular pain and urgency.   Musculoskeletal:  Positive for arthralgias (left hip) and back pain. Negative for gait problem, joint swelling, myalgias, neck pain and neck stiffness.   Skin:  Negative for color change, pallor, rash and wound.   Allergic/Immunologic: Negative for environmental allergies, food  "allergies and immunocompromised state.   Neurological:  Negative for dizziness, tremors, seizures, syncope, facial asymmetry, speech difficulty, weakness, light-headedness, numbness and headaches.   Hematological:  Negative for adenopathy. Does not bruise/bleed easily.   Psychiatric/Behavioral:  Positive for sleep disturbance. Negative for agitation, behavioral problems, confusion, decreased concentration, dysphoric mood, hallucinations, self-injury and suicidal ideas. The patient is not nervous/anxious and is not hyperactive.        Objective   /70 (BP Location: Left arm, Patient Position: Sitting)   Pulse 71   Temp 36.3 °C (97.4 °F) (Tympanic)   Resp 12   Ht 1.803 m (5' 11\")   Wt 79.3 kg (174 lb 14.4 oz)   SpO2 98%   BMI 24.39 kg/m²    Physical Exam  Constitutional:       Appearance: Normal appearance.   HENT:      Head: Normocephalic and atraumatic.      Right Ear: Tympanic membrane, ear canal and external ear normal. There is no impacted cerumen.      Left Ear: Tympanic membrane, ear canal and external ear normal. There is no impacted cerumen.      Nose: Nose normal. No congestion or rhinorrhea.      Mouth/Throat:      Mouth: Mucous membranes are moist.      Pharynx: Oropharynx is clear. No oropharyngeal exudate or posterior oropharyngeal erythema.   Eyes:      Extraocular Movements: Extraocular movements intact.      Conjunctiva/sclera: Conjunctivae normal.      Pupils: Pupils are equal, round, and reactive to light.   Neck:      Vascular: No carotid bruit.   Cardiovascular:      Rate and Rhythm: Normal rate and regular rhythm.      Pulses: Normal pulses.      Heart sounds: Normal heart sounds. No murmur heard.  Pulmonary:      Effort: Pulmonary effort is normal. No respiratory distress.      Breath sounds: Normal breath sounds. No wheezing, rhonchi or rales.   Abdominal:      General: Abdomen is flat. Bowel sounds are normal. There is no distension.      Palpations: Abdomen is soft.      " Tenderness: There is no abdominal tenderness.      Hernia: No hernia is present.   Musculoskeletal:         General: No swelling or tenderness. Normal range of motion.      Cervical back: Normal range of motion and neck supple.      Right lower leg: No edema.      Left lower leg: No edema.   Lymphadenopathy:      Cervical: No cervical adenopathy.   Skin:     General: Skin is warm and dry.      Findings: No lesion or rash.   Neurological:      General: No focal deficit present.      Mental Status: He is alert and oriented to person, place, and time.      Cranial Nerves: No cranial nerve deficit.      Sensory: No sensory deficit.      Motor: No weakness.   Psychiatric:         Mood and Affect: Mood normal.         Behavior: Behavior normal.         Thought Content: Thought content normal.         Judgment: Judgment normal.         Assessment/Plan   Problem List Items Addressed This Visit       Situational anxiety    No current issues          Asthma    Has inhalers and Singulair use          Gynecomastia, male    On Tamoxifen for this  Will check PSA          Hip pain    Had moderate OA of hips at time of labral repairs  Having worsening pain of left hip  Will see ortho end of month   Uses Meloxicam PRN          Gastroesophageal reflux disease    He uses PPI PRN but prefer something with less possible side effects  Rx for famotidine 20 mg given to use 1-2 tablets as needed          Relevant Medications    famotidine (Pepcid) 20 mg tablet     Other Visit Diagnoses         Routine general medical examination at a health care facility    -  Primary    Relevant Orders    Comprehensive Metabolic Panel    Urinalysis with Reflex Microscopic    Follow Up In Primary Care - Health Maintenance      Leukopenia, unspecified type        Relevant Orders    CBC and Auto Differential      Screening for lipid disorders        Relevant Orders    Lipid Panel      Screening for thyroid disorder        Relevant Orders    TSH with reflex to  Free T4 if abnormal      Travel-related illness        Relevant Medications    azithromycin (Zithromax Z-Martín) 250 mg tablet      Traveler's diarrhea        Relevant Medications    azithromycin (Zithromax Z-Martín) 250 mg tablet      Screening for prostate cancer        Relevant Orders    PSA

## 2025-06-30 DIAGNOSIS — K21.9 GASTROESOPHAGEAL REFLUX DISEASE WITHOUT ESOPHAGITIS: ICD-10-CM

## 2025-06-30 RX ORDER — FAMOTIDINE 20 MG/1
20 TABLET, FILM COATED ORAL 2 TIMES DAILY PRN
Qty: 180 TABLET | Refills: 1 | Status: SHIPPED | OUTPATIENT
Start: 2025-06-30 | End: 2025-12-27

## 2025-07-17 ENCOUNTER — OFFICE VISIT (OUTPATIENT)
Dept: PRIMARY CARE | Facility: CLINIC | Age: 45
End: 2025-07-17
Payer: COMMERCIAL

## 2025-07-17 VITALS
TEMPERATURE: 98.2 F | HEART RATE: 71 BPM | DIASTOLIC BLOOD PRESSURE: 78 MMHG | OXYGEN SATURATION: 98 % | SYSTOLIC BLOOD PRESSURE: 119 MMHG

## 2025-07-17 DIAGNOSIS — J01.00 ACUTE NON-RECURRENT MAXILLARY SINUSITIS: Primary | ICD-10-CM

## 2025-07-17 PROCEDURE — 99213 OFFICE O/P EST LOW 20 MIN: CPT | Performed by: INTERNAL MEDICINE

## 2025-07-17 RX ORDER — AMOXICILLIN AND CLAVULANATE POTASSIUM 875; 125 MG/1; MG/1
875 TABLET, FILM COATED ORAL 2 TIMES DAILY
Qty: 20 TABLET | Refills: 0 | Status: SHIPPED | OUTPATIENT
Start: 2025-07-17 | End: 2025-07-27

## 2025-07-17 ASSESSMENT — ENCOUNTER SYMPTOMS
UNEXPECTED WEIGHT CHANGE: 0
WHEEZING: 0
ACTIVITY CHANGE: 0
CHILLS: 0
DIAPHORESIS: 0
SHORTNESS OF BREATH: 0
FEVER: 0
COUGH: 0
APPETITE CHANGE: 0
FATIGUE: 0
CHEST TIGHTNESS: 0

## 2025-07-17 NOTE — PROGRESS NOTES
Subjective   Patient ID: Madhav Rodriguez is a 44 y.o. male who presents for URI (Possible- pressure in face and head, bilateral ear pain ).    URI   Pertinent negatives include no coughing or wheezing.     Pt here in acute visit.  He was in [facility] and came home 2 days ago.  He started with symptoms on the plane.  He is having drainage/pressure/pain in face/behind eyes.  Ears feel pressure.  He has some dizziness/lightheaded and head feels like a balloon.  He is using his inhaler and ibuprofen.      Review of Systems   Constitutional:  Negative for activity change, appetite change, chills, diaphoresis, fatigue, fever and unexpected weight change.   Respiratory:  Negative for cough, chest tightness, shortness of breath and wheezing.        Objective   /78 (BP Location: Right arm, Patient Position: Sitting)   Pulse 71   Temp 36.8 °C (98.2 °F) (Oral)   SpO2 98%    Physical Exam  Constitutional:       Appearance: He is ill-appearing.   HENT:      Head:      Comments: Facial tenderness when palpating frontal and maxillary sinus cavities      Right Ear: Tympanic membrane normal.      Left Ear: Tympanic membrane normal.      Nose: Congestion present.      Comments: Boggy turbinates with thick/sticky mucous noted in both nostrils      Mouth/Throat:      Pharynx: Posterior oropharyngeal erythema present. No oropharyngeal exudate.     Cardiovascular:      Rate and Rhythm: Normal rate and regular rhythm.      Heart sounds: Normal heart sounds.   Pulmonary:      Effort: Pulmonary effort is normal.      Breath sounds: Wheezing present.      Comments: Did have end expiratory wheeze noted left upper lobe   Lymphadenopathy:      Cervical: No cervical adenopathy.     Neurological:      Mental Status: He is alert and oriented to person, place, and time.         Assessment/Plan   Problem List Items Addressed This Visit    None  Visit Diagnoses         Acute non-recurrent maxillary sinusitis    -  Primary    Relevant Medications     amoxicillin-clavulanate (Augmentin) 875-125 mg tablet

## 2025-07-17 NOTE — PATIENT INSTRUCTIONS
Start and take antibiotic as directed  Use inhaler as needed if feeling chest tightness, shortness of breath or wheezing  Use antihistamines/nasal sprays to help manage symptoms  Rest, push fluids, tylenol/advil for fevers/body aches/headaches  Follow up as needed

## 2025-08-03 LAB
ALBUMIN SERPL-MCNC: 4.6 G/DL (ref 3.6–5.1)
ALP SERPL-CCNC: 68 U/L (ref 36–130)
ALT SERPL-CCNC: 19 U/L (ref 9–46)
ANION GAP SERPL CALCULATED.4IONS-SCNC: 6 MMOL/L (CALC) (ref 7–17)
AST SERPL-CCNC: 15 U/L (ref 10–40)
BASOPHILS # BLD AUTO: 22 CELLS/UL (ref 0–200)
BASOPHILS NFR BLD AUTO: 0.7 %
BILIRUB SERPL-MCNC: 0.9 MG/DL (ref 0.2–1.2)
BUN SERPL-MCNC: 25 MG/DL (ref 7–25)
CALCIUM SERPL-MCNC: 9 MG/DL (ref 8.6–10.3)
CHLORIDE SERPL-SCNC: 105 MMOL/L (ref 98–110)
CHOLEST SERPL-MCNC: 184 MG/DL
CHOLEST/HDLC SERPL: 2.8 (CALC)
CO2 SERPL-SCNC: 27 MMOL/L (ref 20–32)
COLOR UR: NORMAL
CREAT SERPL-MCNC: 1 MG/DL (ref 0.6–1.29)
EGFRCR SERPLBLD CKD-EPI 2021: 94 ML/MIN/1.73M2
EOSINOPHIL # BLD AUTO: 81 CELLS/UL (ref 15–500)
EOSINOPHIL NFR BLD AUTO: 2.6 %
ERYTHROCYTE [DISTWIDTH] IN BLOOD BY AUTOMATED COUNT: 13 % (ref 11–15)
GLUCOSE SERPL-MCNC: 98 MG/DL (ref 65–99)
HCT VFR BLD AUTO: 48.2 % (ref 38.5–50)
HDLC SERPL-MCNC: 66 MG/DL
HGB BLD-MCNC: 16 G/DL (ref 13.2–17.1)
LDLC SERPL CALC-MCNC: 104 MG/DL (CALC)
LYMPHOCYTES # BLD AUTO: 722 CELLS/UL (ref 850–3900)
LYMPHOCYTES NFR BLD AUTO: 23.3 %
MCH RBC QN AUTO: 32.8 PG (ref 27–33)
MCHC RBC AUTO-ENTMCNC: 33.2 G/DL (ref 32–36)
MCV RBC AUTO: 98.8 FL (ref 80–100)
MONOCYTES # BLD AUTO: 326 CELLS/UL (ref 200–950)
MONOCYTES NFR BLD AUTO: 10.5 %
NEUTROPHILS # BLD AUTO: 1950 CELLS/UL (ref 1500–7800)
NEUTROPHILS NFR BLD AUTO: 62.9 %
NONHDLC SERPL-MCNC: 118 MG/DL (CALC)
PLATELET # BLD AUTO: 156 THOUSAND/UL (ref 140–400)
PMV BLD REES-ECKER: 11.9 FL (ref 7.5–12.5)
POTASSIUM SERPL-SCNC: 4.4 MMOL/L (ref 3.5–5.3)
PROT SERPL-MCNC: 6.9 G/DL (ref 6.1–8.1)
PSA SERPL-MCNC: 0.55 NG/ML
RBC # BLD AUTO: 4.88 MILLION/UL (ref 4.2–5.8)
SODIUM SERPL-SCNC: 138 MMOL/L (ref 135–146)
TRIGL SERPL-MCNC: 48 MG/DL
TSH SERPL-ACNC: 1.49 MIU/L (ref 0.4–4.5)
WBC # BLD AUTO: 3.1 THOUSAND/UL (ref 3.8–10.8)

## 2025-08-04 ENCOUNTER — TELEPHONE (OUTPATIENT)
Dept: PRIMARY CARE | Facility: CLINIC | Age: 45
End: 2025-08-04
Payer: COMMERCIAL

## 2025-08-04 NOTE — TELEPHONE ENCOUNTER
I have no reason to test his urine-not on any meds requiring it and doesn't have any conditions that warrant it

## 2025-08-04 NOTE — TELEPHONE ENCOUNTER
Patient aware of Urinalysis not performed due to little amount- he is asking that you replace the order he said he does not have any burning, pain, discharge or frequency but voices sometimes he just gets nervous. Please advise

## 2026-06-04 ENCOUNTER — APPOINTMENT (OUTPATIENT)
Dept: PRIMARY CARE | Facility: CLINIC | Age: 46
End: 2026-06-04
Payer: COMMERCIAL